# Patient Record
Sex: MALE | Race: BLACK OR AFRICAN AMERICAN | NOT HISPANIC OR LATINO | Employment: FULL TIME | URBAN - METROPOLITAN AREA
[De-identification: names, ages, dates, MRNs, and addresses within clinical notes are randomized per-mention and may not be internally consistent; named-entity substitution may affect disease eponyms.]

---
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
## 2023-09-13 ENCOUNTER — HOSPITAL ENCOUNTER (EMERGENCY)
Facility: OTHER | Age: 18
Discharge: HOME | End: 2023-09-13
Attending: EMERGENCY MEDICINE
Payer: MEDICAID

## 2023-09-13 VITALS
TEMPERATURE: 98 F | DIASTOLIC BLOOD PRESSURE: 58 MMHG | OXYGEN SATURATION: 99 % | RESPIRATION RATE: 17 BRPM | BODY MASS INDEX: 19.89 KG/M2 | SYSTOLIC BLOOD PRESSURE: 118 MMHG | HEART RATE: 64 BPM | HEIGHT: 74 IN | WEIGHT: 155 LBS

## 2023-09-13 DIAGNOSIS — Z71.1 CONCERN ABOUT STD IN MALE WITHOUT DIAGNOSIS: ICD-10-CM

## 2023-09-13 DIAGNOSIS — R36.9 PENILE DISCHARGE: Primary | ICD-10-CM

## 2023-09-13 PROCEDURE — 63600175 PHARM REV CODE 636 W HCPCS: Performed by: NURSE PRACTITIONER

## 2023-09-13 PROCEDURE — 99284 EMERGENCY DEPT VISIT MOD MDM: CPT

## 2023-09-13 PROCEDURE — 96372 THER/PROPH/DIAG INJ SC/IM: CPT | Performed by: NURSE PRACTITIONER

## 2023-09-13 PROCEDURE — 63700000 PHARM REV CODE 250 ALT 637 W/O HCPCS: Performed by: NURSE PRACTITIONER

## 2023-09-13 PROCEDURE — 87591 N.GONORRHOEAE DNA AMP PROB: CPT | Performed by: NURSE PRACTITIONER

## 2023-09-13 RX ORDER — AZITHROMYCIN 250 MG/1
1000 TABLET, FILM COATED ORAL
Status: COMPLETED | OUTPATIENT
Start: 2023-09-13 | End: 2023-09-13

## 2023-09-13 RX ORDER — CEFTRIAXONE 500 MG/1
500 INJECTION, POWDER, FOR SOLUTION INTRAMUSCULAR; INTRAVENOUS
Status: COMPLETED | OUTPATIENT
Start: 2023-09-13 | End: 2023-09-13

## 2023-09-13 RX ADMIN — CEFTRIAXONE SODIUM 500 MG: 500 INJECTION, POWDER, FOR SOLUTION INTRAMUSCULAR; INTRAVENOUS at 08:09

## 2023-09-13 RX ADMIN — AZITHROMYCIN MONOHYDRATE 1000 MG: 250 TABLET ORAL at 08:09

## 2023-09-14 NOTE — ED TRIAGE NOTES
Pt presents to ED with c/o exposure to STD. Pt reports meatal discharge white and burning while urinating x4 days. Reports unprotected sex. Pt states using a new brand of soap which he thinks irritated skin. Denies any other complaints at this time. AAOx4, NAD noted.

## 2023-09-14 NOTE — ED PROVIDER NOTES
"Source of History:  Patient    Chief complaint:  Exposure to STD (Possible exposure about one week ago./Presented with discharge 4 days ago and wanting further evaluation)      HPI:  Beto Rhoades is a 17 y.o. male presenting with c/o penile discharge and dysuria that began 4 days ago.  He denies any testicle pain, swelling or any abdominal pain or flank pain.  Reports recent unprotected sex.    This is the extent to the patients complaints today here in the emergency department.    PMH:  As per HPI and below:  No past medical history on file.  No past surgical history on file.    Social History     Tobacco Use    Smoking status: Never    Smokeless tobacco: Never   Substance Use Topics    Alcohol use: No    Drug use: No     Review of patient's allergies indicates:  No Known Allergies    ROS: As per HPI and below:  Constitutional: No fever.  No chills.  Eyes: No visual changes.  ENT: No sore throat. No ear pain    Cardiovascular: No chest pain.  Respiratory: No shortness of breath.  GI:  No abdominal pain  Genitourinary:  Penile discharge/dysuria  Neurologic: No headache. No focal weakness.  No numbness.  MSK: no back pain   Integument: No rashes or lesions.  Hematologic: No easy bruising.  Endocrine: No excessive thirst or urination.    Physical Exam:    BP (!) 118/58   Pulse 64   Temp 98.2 °F (36.8 °C) (Oral)   Resp 17   Ht 6' 2" (1.88 m)   Wt 70.3 kg (155 lb)   SpO2 99%   BMI 19.90 kg/m²   Vitals:    09/13/23 2022   BP: (!) 118/58   Pulse: 64   Resp: 17   Temp: 98.2 °F (36.8 °C)   TempSrc: Oral   SpO2: 99%   Weight: 70.3 kg (155 lb)   Height: 6' 2" (1.88 m)       Nurse's notes vitals reviewed  Constitutional: Patient alert and oriented well-developed well-nourished  Eyes:  Normal conjunctiva.  Extraocular muscles are intact.  ENT: Oral mucosa moist  GI:  Nontender to palpation, bowel sounds normal.  Musculoskeletal: Normocephalic atraumatic, normal range of motion, no obvious deformities  Skin: Warm and " dry no rashes or lesions, no ecchymosis, no erythema  Neuro: alert and oriented x3,  no focal neurological deficits.  Psych: Appropriate, conversant    Labs Reviewed   C. TRACHOMATIS/N. GONORRHOEAE BY AMP DNA       No orders to display         Initial Impression/ Differential Dx:  Differential Diagnosis includes, but is not limited to:  STI, urethritis, testicular/appendix torsion, epididymitis, orchitis, UTI, kidney stone, appendicitis, prostatitis, testicular mass/neoplasm, incarcerated/strangulated hernia.      MDM:    17 y.o. male with recent unprotected sex and dysuria and penile discharge for the last 4 days. After careful evaluation of the patient's physical exam and history of present illness, I believe the patient is at risk for a sexually transmitted disease due to either possible exposure for known exposure.  I will treat the patient with azithromycin and Rocephin  in the emergency department.  I discussed abstaining from sex ×2 weeks, and must inform all sexual partners of possible STD exposure.  Instructed patient to follow up with their primary care, and I provided an STD clinic contact information for continued evaluation.  Patient was allowed to ask questions, and verbalizes understanding.           Diagnostic Impression:    1. Penile discharge    2. Concern about STD in male without diagnosis         ED Disposition Condition    Discharge Stable            ED Prescriptions    None       Follow-up Information       Follow up With Specialties Details Why Contact Info    [facility] - Emergency Dept Emergency Medicine Go to  If symptoms worsen [address] [facility] [id number] [phone]             Jelly Montalvo., [facility]  09/13/23 2038

## 2023-09-14 NOTE — DISCHARGE INSTRUCTIONS
No sex for the next 2 weeks, as you can pass the infection back and forth.    You need to also inform your sexual partners of the need to be tested and treated.

## 2023-09-15 LAB
C TRACH DNA SPEC QL NAA+PROBE: NOT DETECTED
N GONORRHOEA DNA SPEC QL NAA+PROBE: DETECTED

## 2024-02-22 ENCOUNTER — OCCUPATIONAL HEALTH (OUTPATIENT)
Dept: URGENT CARE | Facility: CLINIC | Age: 19
End: 2024-02-22

## 2024-02-22 DIAGNOSIS — Z02.83 ENCOUNTER FOR DRUG SCREENING: Primary | ICD-10-CM

## 2024-02-22 LAB
CTP QC/QA: YES
POC 5 PANEL DRUG SCREEN: NEGATIVE

## 2024-02-22 PROCEDURE — 80305 DRUG TEST PRSMV DIR OPT OBS: CPT | Mod: S$GLB,,, | Performed by: SURGERY

## 2024-07-08 ENCOUNTER — HOSPITAL ENCOUNTER (EMERGENCY)
Facility: OTHER | Age: 19
Discharge: HOME | End: 2024-07-08
Attending: EMERGENCY MEDICINE
Payer: MEDICAID

## 2024-07-08 VITALS
HEIGHT: 74 IN | TEMPERATURE: 98 F | RESPIRATION RATE: 18 BRPM | BODY MASS INDEX: 23.1 KG/M2 | WEIGHT: 180 LBS | HEART RATE: 66 BPM | DIASTOLIC BLOOD PRESSURE: 63 MMHG | OXYGEN SATURATION: 97 % | SYSTOLIC BLOOD PRESSURE: 125 MMHG
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
## 2024-07-08 DIAGNOSIS — S39.012A STRAIN OF LUMBAR REGION, INITIAL ENCOUNTER: Primary | ICD-10-CM

## 2024-07-08 DIAGNOSIS — J02.9 VIRAL PHARYNGITIS: ICD-10-CM

## 2024-07-08 LAB
CTP QC/QA: YES
CTP QC/QA: YES
GROUP A STREP, MOLECULAR: NEGATIVE
POC MOLECULAR INFLUENZA A AGN: NEGATIVE
POC MOLECULAR INFLUENZA B AGN: NEGATIVE
SARS-COV-2 RDRP RESP QL NAA+PROBE: NEGATIVE

## 2024-07-08 PROCEDURE — 87651 STREP A DNA AMP PROBE: CPT | Performed by: NURSE PRACTITIONER

## 2024-07-08 PROCEDURE — 25000003 PHARM REV CODE 250: Performed by: PHYSICIAN ASSISTANT

## 2024-07-08 PROCEDURE — 99284 EMERGENCY DEPT VISIT MOD MDM: CPT | Mod: 25

## 2024-07-08 PROCEDURE — 25000003 PHARM REV CODE 250: Performed by: NURSE PRACTITIONER

## 2024-07-08 PROCEDURE — 87635 SARS-COV-2 COVID-19 AMP PRB: CPT | Performed by: NURSE PRACTITIONER

## 2024-07-08 RX ORDER — CYCLOBENZAPRINE HCL 10 MG
10 TABLET ORAL 3 TIMES DAILY PRN
Qty: 20 TABLET | Refills: 0 | Status: SHIPPED | OUTPATIENT
Start: 2024-07-08 | End: 2024-07-15

## 2024-07-08 RX ORDER — IBUPROFEN 600 MG/1
600 TABLET ORAL
Status: COMPLETED | OUTPATIENT
Start: 2024-07-08 | End: 2024-07-08

## 2024-07-08 RX ORDER — ACETAMINOPHEN 500 MG
500 TABLET ORAL EVERY 4 HOURS PRN
Qty: 20 TABLET | Refills: 0 | Status: SHIPPED | OUTPATIENT
Start: 2024-07-08 | End: 2024-07-13

## 2024-07-08 RX ORDER — IBUPROFEN 600 MG/1
600 TABLET ORAL EVERY 6 HOURS PRN
Qty: 20 TABLET | Refills: 0 | Status: SHIPPED | OUTPATIENT
Start: 2024-07-08

## 2024-07-08 RX ORDER — PHENOL 1.4 %
AEROSOL, SPRAY (ML) MUCOUS MEMBRANE
Qty: 20 ML | Refills: 0 | Status: SHIPPED | OUTPATIENT
Start: 2024-07-08

## 2024-07-08 RX ORDER — ACETAMINOPHEN 325 MG/1
650 TABLET ORAL
Status: COMPLETED | OUTPATIENT
Start: 2024-07-08 | End: 2024-07-08

## 2024-07-08 RX ADMIN — IBUPROFEN 600 MG: 600 TABLET, FILM COATED ORAL at 10:07

## 2024-07-08 RX ADMIN — ACETAMINOPHEN 650 MG: 325 TABLET, FILM COATED ORAL at 10:07
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
## 2024-07-08 NOTE — Clinical Note
"Beto Mark THEA "Beto Rhoades was seen and treated in our emergency department on 7/8/2024.     COVID-19 is present in our communities across the state. There is limited testing for COVID at this time, so not all patients can be tested. In this situation, your employee meets the following criteria:    Beto Rhoades has met the criteria for COVID-19 testing and has a NEGATIVE result. The employee can return to work once they are asymptomatic for 24 hours without the use of fever reducing medications (Tylenol, Motrin, etc).     If the employee is not fully vaccinated and had a close contact:  · Retest at 5 to 7 days post-exposure  · If possible, it is recommended that they quarantine for 5 days from the time of contact regardless of their test status.  · A mask should be worn post quarantine for 5 days.    If you have any questions or concerns, or if I can be of further assistance, please do not hesitate to contact me.    Sincerely,             Irene Swenson PA-C"
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
## 2024-07-08 NOTE — DISCHARGE INSTRUCTIONS

## 2024-07-19 ENCOUNTER — PATIENT MESSAGE (OUTPATIENT)
Dept: FAMILY MEDICINE | Facility: CLINIC | Age: 19
End: 2024-07-19
Payer: MEDICAID

## 2024-07-23 ENCOUNTER — HOSPITAL ENCOUNTER (EMERGENCY)
Facility: HOSPITAL | Age: 19
Discharge: HOME | End: 2024-07-23
Attending: STUDENT IN AN ORGANIZED HEALTH CARE EDUCATION/TRAINING PROGRAM
Payer: MEDICAID

## 2024-07-23 VITALS
RESPIRATION RATE: 16 BRPM | HEIGHT: 74 IN | DIASTOLIC BLOOD PRESSURE: 72 MMHG | OXYGEN SATURATION: 97 % | BODY MASS INDEX: 22.84 KG/M2 | HEART RATE: 63 BPM | SYSTOLIC BLOOD PRESSURE: 117 MMHG | TEMPERATURE: 98 F | WEIGHT: 178 LBS

## 2024-07-23 DIAGNOSIS — N30.00 ACUTE CYSTITIS WITHOUT HEMATURIA: ICD-10-CM

## 2024-07-23 DIAGNOSIS — N34.2 URETHRITIS: Primary | ICD-10-CM

## 2024-07-23 LAB
BACTERIA #/AREA URNS AUTO: ABNORMAL /HPF
BILIRUB UR QL STRIP: ABNORMAL
CLARITY UR REFRACT.AUTO: ABNORMAL
COLOR UR AUTO: ABNORMAL
GLUCOSE UR QL STRIP: NEGATIVE
HGB UR QL STRIP: ABNORMAL
KETONES UR QL STRIP: NEGATIVE
LEUKOCYTE ESTERASE UR QL STRIP: ABNORMAL
MICROSCOPIC COMMENT: ABNORMAL
NITRITE UR QL STRIP: POSITIVE
PH UR STRIP: 6 [PH] (ref 5–8)
PROT UR QL STRIP: ABNORMAL
RBC #/AREA URNS AUTO: 7 /HPF (ref 0–4)
SP GR UR STRIP: 1.02 (ref 1–1.03)
SQUAMOUS #/AREA URNS AUTO: 0 /HPF
URN SPEC COLLECT METH UR: ABNORMAL
WBC #/AREA URNS AUTO: >100 /HPF (ref 0–5)

## 2024-07-23 PROCEDURE — 96372 THER/PROPH/DIAG INJ SC/IM: CPT | Performed by: STUDENT IN AN ORGANIZED HEALTH CARE EDUCATION/TRAINING PROGRAM

## 2024-07-23 PROCEDURE — 81001 URINALYSIS AUTO W/SCOPE: CPT | Performed by: STUDENT IN AN ORGANIZED HEALTH CARE EDUCATION/TRAINING PROGRAM

## 2024-07-23 PROCEDURE — 25000003 PHARM REV CODE 250: Performed by: STUDENT IN AN ORGANIZED HEALTH CARE EDUCATION/TRAINING PROGRAM

## 2024-07-23 PROCEDURE — 87491 CHLMYD TRACH DNA AMP PROBE: CPT | Performed by: STUDENT IN AN ORGANIZED HEALTH CARE EDUCATION/TRAINING PROGRAM

## 2024-07-23 PROCEDURE — 99284 EMERGENCY DEPT VISIT MOD MDM: CPT | Mod: 25

## 2024-07-23 PROCEDURE — 63600175 PHARM REV CODE 636 W HCPCS: Performed by: STUDENT IN AN ORGANIZED HEALTH CARE EDUCATION/TRAINING PROGRAM

## 2024-07-23 PROCEDURE — 87086 URINE CULTURE/COLONY COUNT: CPT | Performed by: STUDENT IN AN ORGANIZED HEALTH CARE EDUCATION/TRAINING PROGRAM

## 2024-07-23 PROCEDURE — 87661 TRICHOMONAS VAGINALIS AMPLIF: CPT | Performed by: STUDENT IN AN ORGANIZED HEALTH CARE EDUCATION/TRAINING PROGRAM

## 2024-07-23 RX ORDER — DOXYCYCLINE 100 MG/1
100 CAPSULE ORAL 2 TIMES DAILY
Qty: 14 CAPSULE | Refills: 0 | Status: SHIPPED | OUTPATIENT
Start: 2024-07-23 | End: 2024-07-30

## 2024-07-23 RX ORDER — DOXYCYCLINE HYCLATE 100 MG
100 TABLET ORAL ONCE
Status: COMPLETED | OUTPATIENT
Start: 2024-07-23 | End: 2024-07-23

## 2024-07-23 RX ORDER — CEPHALEXIN 500 MG/1
500 CAPSULE ORAL 4 TIMES DAILY
Qty: 20 CAPSULE | Refills: 0 | Status: SHIPPED | OUTPATIENT
Start: 2024-07-23 | End: 2024-07-28

## 2024-07-23 RX ORDER — CEFTRIAXONE 500 MG/1
500 INJECTION, POWDER, FOR SOLUTION INTRAMUSCULAR; INTRAVENOUS
Status: COMPLETED | OUTPATIENT
Start: 2024-07-23 | End: 2024-07-23

## 2024-07-23 RX ADMIN — CEFTRIAXONE SODIUM 500 MG: 500 INJECTION, POWDER, FOR SOLUTION INTRAMUSCULAR; INTRAVENOUS at 03:07

## 2024-07-23 RX ADMIN — DOXYCYCLINE HYCLATE 100 MG: 100 TABLET, COATED ORAL at 03:07

## 2024-07-23 NOTE — ED TRIAGE NOTES
"Beto Mark Rhoades, a 18 y.o. male presents to the ED w/ complaint of urinary frequency and urinary discharge, pt. States that he has been taking AZO over the counter but that "he needs the pill that will just fix it",     Triage note:  Chief Complaint   Patient presents with    Urinary Frequency     +discharge. Pt reports taking Azo OTC.      Review of patient's allergies indicates:  No Known Allergies  History reviewed. No pertinent past medical history.      APPEARANCE: awake and alert in NAD.  SKIN: warm, dry and intact. No breakdown or bruising.  MUSCULOSKELETAL: Patient moving all extremities spontaneously, no obvious swelling or deformities noted. Ambulates independently.  RESPIRATORY: Denies shortness of breath.Respirations unlabored.   CARDIAC: Denies CP, 2+ distal pulses; no peripheral edema  ABDOMEN: S/ND/NT, Denies nausea,   : voids spontaneously, denies difficulty, + burning when urinating and frequency   Neurologic: AAO x 4; follows commands equal strength in all extremities; denies numbness/tingling. Denies dizziness, MANPREET, pupils 3mm    "

## 2024-07-23 NOTE — DISCHARGE INSTRUCTIONS
You were seen today for burning with urination and penile discharge.  Your exam and history today are concerning for a sexually transmitted infection.  You should take the doxycycline as prescribed.  You were currently being treated for both gonorrhea and chlamydia empirically while your results are pending.  If any of your tests are positive, you should have all of your partners tested and treated before we engaging in sexual activity.  Please refrain from intercourse or other sexual activity until you have been fully treated and retested with negative results in all of your partners have been treated.    Your urine sample also looks concerning for a urinary tract infection.  Until this could also be causing you to have your symptoms.  Please take the Keflex which is an antibiotic as prescribed.  All of your prescriptions were sent to your pharmacy.    If you develop any worsening symptoms, abdominal pain, back pain, fever, chills or any other concerns, you should return to the emergency department for re-evaluation.  Please follow-up with your primary care doctor within the next week.

## 2024-07-23 NOTE — ED PROVIDER NOTES
Encounter Date: 7/23/2024       History     Chief Complaint   Patient presents with    Urinary Frequency     +discharge. Pt reports taking Azo OTC.      HPI  The patient is a previously healthy 18-year-old male who presents for dysuria and penile discharge.  Onset of symptoms 2 days ago.  Patient reports that he was had clear discharge noted from his penis.  He was also been having dysuria.  No hematuria.  No fevers or chills.  No abdominal pain or flank pain.  No penile or scrotal pain or swelling.  Patient does report taking azo earlier today which helped relieve some of the dysuria he was having.  He denies ever having a urinary tract infection in the past but does report that he was treated for both gonorrhea and chlamydia a few months ago.  He is sexually active with women.    Review of patient's allergies indicates:  No Known Allergies  History reviewed. No pertinent past medical history.  History reviewed. No pertinent surgical history.  No family history on file.  Social History     Tobacco Use    Smoking status: Never    Smokeless tobacco: Never   Substance Use Topics    Alcohol use: No    Drug use: No     Review of Systems  A full review of systems was obtained, see HPI for pertinent positives.    Physical Exam     Initial Vitals [07/23/24 0231]   BP Pulse Resp Temp SpO2   117/72 63 16 97.7 °F (36.5 °C) 97 %      MAP       --         Physical Exam  Constitutional: No acute distress, well-appearing, nontoxic  Respiratory: Non-labored  Cardiovascular: Well perfused  Gastrointestinal: Soft, non-tender, non-distended  Genitourinary: No CVA tenderness, genital exam performed with nursing present as chaperone in the room, penis circumcised and normal in appearance, no expressible discharge, testes descended bilaterally, no swelling or tenderness, cremasteric reflexes intact bilaterally  Integumentary: Warm and dry  Musculoskeletal: No deformity  Neurological: Awake and alert  Psychiatric: Cooperative     ED  Course   Procedures  Labs Reviewed   URINALYSIS, REFLEX TO URINE CULTURE - Abnormal       Result Value    Specimen UA Urine, Clean Catch      Color, UA Brown (*)     Appearance, UA Hazy (*)     pH, UA 6.0      Specific Gravity, UA 1.020      Protein, UA Trace (*)     Glucose, UA Negative      Ketones, UA Negative      Bilirubin (UA) 2+ (*)     Occult Blood UA Trace (*)     Nitrite, UA Positive (*)     Leukocytes, UA 3+ (*)     Narrative:     Specimen Source->Urine   URINALYSIS MICROSCOPIC - Abnormal    RBC, UA 7 (*)     WBC, UA >100 (*)     Bacteria Rare      Squam Epithel, UA 0      Microscopic Comment SEE COMMENT      Narrative:     Specimen Source->Urine   TRICHOMONAS VAGINALIS, RNA,QUAL, URINE   CULTURE, URINE   C. TRACHOMATIS/N. GONORRHOEAE BY AMP DNA          Imaging Results    None          Medications   cefTRIAXone injection 500 mg (500 mg Intramuscular Given 7/23/24 0345)   doxycycline tablet 100 mg (100 mg Oral Given 7/23/24 0345)     Medical Decision Making  Patient is a previously healthy 18-year-old male who presents for dysuria and penile discharge.  Normal genitourinary exam.  Abdomen soft, nontender.  Vitals reassuring.  STI testing collected and sent.  Urinalysis ordered.  Patient has history of STI in his high-risk so will go ahead and empirically treat.    UA nitrite positive with significant WBCs.  Symptoms could be related to urinary tract infection.  Will go and treat with Keflex.  In the meantime, patient is high-risk so he was empirically treated with Rocephin here and will also treat with doxycycline to cover for STIs.  Patient was counseled on testing and treatment for his partners and was advised to refrain from sexual activity until he was treated and tested negative.  He was given very strict return precautions prior to discharge and advised to follow up with his primary care doctor.    Amount and/or Complexity of Data Reviewed  Labs: ordered.    Risk  Prescription drug management.                ED Course as of 07/23/24 0541   Tue Jul 23, 2024   0538 UA is also nitrite positive with greater than 100  WBCs.  He has been rare bacteria but it was not a contaminated sample.  Azo can make the urine abnormal however given that it is nitrite positive will go ahead and treat for urinary tract infection as well. [NN]      ED Course User Index  [NN] Irene Pressley MD                           Clinical Impression:  Final diagnoses:  [N34.2] Urethritis (Primary)  [N30.00] Acute cystitis without hematuria          ED Disposition Condition    Discharge Stable          ED Prescriptions       Medication Sig Dispense Start Date End Date Auth. Provider    doxycycline (VIBRAMYCIN) 100 MG Cap Take 1 capsule (100 mg total) by mouth 2 (two) times daily. for 7 days 14 capsule 7/23/2024 7/30/2024 Irene Pressley MD    cephALEXin (KEFLEX) 500 MG capsule Take 1 capsule (500 mg total) by mouth 4 (four) times daily. for 5 days 20 capsule 7/23/2024 7/28/2024 Irene Pressley MD          Follow-up Information       Follow up With Specialties Details Why Contact Info    [facility] Emergency Medicine  As needed, If symptoms worsen [id number]  [facility] [id number] [phone]             Irene Pressley MD  07/23/24 0583

## 2024-07-24 LAB
BACTERIA UR CULT: NO GROWTH
C TRACH DNA SPEC QL NAA+PROBE: DETECTED
N GONORRHOEA DNA SPEC QL NAA+PROBE: DETECTED
SPECIMEN SOURCE: NORMAL
T VAGINALIS RRNA SPEC QL NAA+PROBE: NEGATIVE